# Patient Record
Sex: MALE | Race: WHITE
[De-identification: names, ages, dates, MRNs, and addresses within clinical notes are randomized per-mention and may not be internally consistent; named-entity substitution may affect disease eponyms.]

---

## 2018-06-21 ENCOUNTER — HOSPITAL ENCOUNTER (OUTPATIENT)
Dept: HOSPITAL 89 - ONC | Age: 58
LOS: 1 days | Discharge: HOME | End: 2018-06-22
Attending: NURSE PRACTITIONER
Payer: SELF-PAY

## 2018-06-21 VITALS — BODY MASS INDEX: 24.36 KG/M2

## 2018-06-21 VITALS — DIASTOLIC BLOOD PRESSURE: 85 MMHG | SYSTOLIC BLOOD PRESSURE: 138 MMHG

## 2018-06-21 DIAGNOSIS — Z85.72: Primary | ICD-10-CM

## 2018-06-21 PROCEDURE — 99212 OFFICE O/P EST SF 10 MIN: CPT

## 2018-06-21 NOTE — ONCOLOGY NOTE
EVALUATION~DATE~& TIME: 2018


PRIMARY CARE PHYSICIAN: None, pt. is self employed  has no insurance


LAST SEEN BY DR. Blanco on 2017


ACCOMPANIED BY: self





 Chief complaint: F/u on DLBC lymphoma





DIAGNOSIS:  stage II Diffuse large B-cell lymphoma


Oncology History


-  2015 newly diagnosed diffuse large B cell lymphoma, likely ABC 

subtype


Baseline echocardiogram: Sinus rhythm with frequent premature ventricular 

complexes


Rightward axis


Borderline ECG


No previous ECGs available


Confirmed by REBECCA CASTILLO ([id number]) on 2015 9:24:51 PM


2015


SURGEON: John Ullrich, MD


Bone marrow aspirate.


2.  Bone core biopsy.


3.  Right shoulder lipoma


A 10 mm flat Adi-Ware drain was placed in the cavity left behind after 

excising the right shoulder lipoma.





port placement, unilateral bone marrow biopsy, PET scan in [place]





Treatment 


He completed R-Chop x 6 cycles between 10-1-15 to 16.  with curative intent


He completed a PET scan  on 16 which showed a complete response to 

treatment with no metabolic activity.  


His spleen had decreased in size and numerous fractures were healing.





HPI


Mr. Joy is a very pleasant  57 year old man who has  stage II Diffuse large B

-cell lymphoma, status post 6 cycles of R CHOP in 2016 . Overall 

patient reports that he is feeling very well he denies any B- symptoms such as 

drenching night sweats, fatigue, or palpable lymph nodes. He  keeps very active 

as a contractor worker, Reports no fatigue, no fevers, no recent 

hospitalizations, reports no GI symptoms as well as no changes in bowel or 

bladder pattern. no weight loss or weight gain. Patient informs me that he is 

currently completing a 14 day course of antibiotics for a tooth extraction 

procedure planned in the near future, he reports antibiotic being keflex. 








Living conditions: lives alone, 2 children are his support system





Diagnostic tests & Reports


Reviewed on [employer]





PAST MEDICAL/SURGICAL HISTORY


FAMILY~HISTORY:


MI (myocardial infarction)


  FATHER, and uncle , Age:55





Psychosocial History 


Social History


Patient is single, , has  two children a 35 year old daughter and a 32 

year  old son. 


Occupational History


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 

a week.


Alcohol History


He denies use


Smoking History:  1pack day since 27 years old


Smoking Status:  Yes, less than 1 pack per day





Medications and Allergies


Reported Medications


Aspirin (Aspir 81)81 Mg Tablet.dr81 Mg PO QDAY


   3/14/17


Multivitamin (Multi-Vitamin Daily)1 Each Tablet1 Each PO


   9/23/15


Omega-3 Fatty Acids (Fish Oil)Unknown Strength Capsule.Unknown Dose PO


   9/2/15


Allergies:  


Coded Allergies:  


     Penicillins (Verified  Allergy, Unknown, 12/18/15)





SOCIAL /OCCUPATION HISTORY:


PREVENTIVE: tetanus vaccine 2017 (reported)


-Colonoscopy. Not on file


MEDICATIONS:


Reported Medications


Aspirin (Aspir 81)81 Mg Tablet.dr81 Mg PO QDAY


   3/14/17


Multivitamin (Multi-Vitamin Daily)1 Each Tablet1 Each PO


   9/23/15


Omega-3 Fatty Acids (Fish Oil)Unknown Strength Capsule.dUnknown Dose PO


   9/2/15


Keflex for pre-op preparation tooth extraction 18


ALLERGIES:


Penicillins (Verified  Allergy, Unknown, 18)


Review of System


CONSTITUTION: denies fevers, sweats, change in appetite, energy, or weight


EYES: No blurred vision, no double vision


ENT: no mouth soreness, trouble swallowing, neck masses


RESPIRATORY: Denies pleuritic pain, dyspnea, wheezing, coughing


CARDIOVASCULAR: Denies cardiac type chest pain, palpitations, leg edema


GI: denies trouble swallowing, indigestion, abdominal pain, diarrhea, 

constipation, blood in stool


: No blood in the urine, no urinary urgency/frequency, no dysuria, no 


MUSCULOSKELETAL: no joint pain, no muscle pain, no limited ROM, no back pain


NEURO: denies headaches, dizziness, neuropathy, focal weakness


SKIN: denies bruising, rashes, changing or suspicious lesions,


HEMATOLOGY: denies spontaneous bleeding, denies non-palpable nodes


PSYCH: denies mood changes, depression, anxiety





Physical Exam


Vital Signs


Temperature:         97.8


Pulse:                    91 


BP Systolic:           13


BP Diastolic:          8 


Respiratory Rate:   16


O2 SAT:                94% RA


O2 Delivery:            





Height (inches)      69.00  


Weight lb:             165 


Weight oz:            


Weight Kg (Sandip):     





Pain:                    0


VITALS


PERFORMANCE STATUS: ECOG O- fully active, able to carry on all pre-disease 

performance w/o restriction


GENERAL: pleasant conversant, male in no apparent distress


ORAL: mucosa moist without lesions, pharynx not injected


EYES: no icterus, no pale conjunctivae, EOMI, PERRLA


NECK: supple, no masses, no palpable lymph nodes


LUNGS: clear to auscultation bilaterally, breathing, non-labored


CVS: regular rate, rhythm, nl s1, s2, no murmurs


ABD: normal bowel sounds, soft non tender, non-distended, no hepatomegaly, no 

splenomegaly, no masses


EXTREMITIES: no edema, no cyanosis


MUSCULOSKELETAL: grossly normal gait, range of motion stable


NEURO: alert, appropriate, motor grossly normal, sensory grossly non focal, and 

cranial nerves grossly intact


NODES: no cervical, supraclavicular, axillary, inguinal adenopathy


SKIN: no ecchymosis, petechiae, no open wounds, no itchiness.


PSYCH: normal mood and affect, good judgment and insight.








Assessment & Plan





Mr. Joy is a very pleasant  57 year old man who has  stage II Diffuse large B

-cell lymphoma, status post 6 cycles of R CHOP in 2016 . PET scan shows 

disease in remission. 


 WBC : 8.9; HGB 18.5; ANC5.1; uric acid 6.1; there was no LDH value data.





1. F/u in 6 months with MD/JOB with CBC w/diff, CMP. LDH


2. Hypertriglyceridemia labs on 18 show Tr of 188. TLC teaching provided 

to patient, to cut amount of cheese, summer sausage, increase fruit, vegetables

, and water intake. 


3. Patient is due for Colonoscopy, he never had one at 57 years old


4. Monitor Triglycerides levels with next labs


5. patient to call cancer center with any issues or concerns





TIME SPENT:  15 minutes > 10 minutes includes but not limited to discussion, 

counselling and co-ordination~ of care. Discussion with other health care 

providers, record review, review of lab work, diagnostic tests.


Plan discussed extensively with patient. All the questions answered today. 

Thank you for the opportunity to be involved in the care of Mr. Joy.


Billing Level: Return visit 3














ISABELLA WARE, ONC 2018 10:23

## 2018-12-17 ENCOUNTER — HOSPITAL ENCOUNTER (OUTPATIENT)
Dept: HOSPITAL 89 - ONC | Age: 58
LOS: 49 days | Discharge: HOME | End: 2019-02-04
Attending: INTERNAL MEDICINE
Payer: SELF-PAY

## 2018-12-17 VITALS — WEIGHT: 176.37 LBS | BODY MASS INDEX: 24.36 KG/M2

## 2018-12-17 VITALS — DIASTOLIC BLOOD PRESSURE: 79 MMHG | SYSTOLIC BLOOD PRESSURE: 124 MMHG

## 2018-12-17 DIAGNOSIS — Z79.82: ICD-10-CM

## 2018-12-17 DIAGNOSIS — Z92.21: ICD-10-CM

## 2018-12-17 DIAGNOSIS — D58.2: ICD-10-CM

## 2018-12-17 DIAGNOSIS — C83.30: Primary | ICD-10-CM

## 2018-12-17 PROCEDURE — 99212 OFFICE O/P EST SF 10 MIN: CPT

## 2018-12-17 NOTE — SCHUSTER ONCOLOGY NOTE
EVENT DATE:  December 17, 2018



CHIEF COMPLAINT/REASON FOR VISIT

Mr. Joy is a pleasant 58-year-old gentleman with stage II diffuse large B-

cell lymphoma, status post six cycles of R-CHOP, finishing in January 2016 that 

returns for followup. 



HISTORY OF PRESENT ILLNESS 

Ramírez returns.  He finished R-CHOP chemotherapy in January 2016 and his post-

treatment PET scan showed complete remission.  He continues to do well.  His 

energy is excellent and he continues to work full-time.  He has gained a little 

bit of weight and has maintained this. No concerning lumps or bumps, fevers, 

chills, night sweats or other symptoms today.  No GI symptoms of concern. 



PAST MEDICAL HISTORY, PAST SURGICAL HISTORY

Diffuse large B cell lymphoma likely stage II, likely ABC subtype, diagnosed 

August 2015.  Treatment with curative intent was R-CHOP x6.



ALLERGIES

PENICILLIN.



MEDICATIONS

1.  Aspirin 18 mg daily.

2.  Multivitamin.



SOCIAL HISTORY

The patient is single.  He works as a contractor.  High intensity work, working 

seven days a week.  He is booked out over a year in advance.



FAMILY HISTORY

Noncontributory.



REVIEW OF SYSTEMS

CONSTITUTIONAL:  No fevers, chills or weight change that is unintentional.

HEENT:  No headache or vision changes.

CARDIOVASCULAR:  No chest pain, palpitations, dyspnea on exertion or edema.

RESPIRATORY:  No shortness of breath, wheeze or cough.

GASTROINTESTINAL:  No nausea, vomiting, diarrhea.

GENITOURINARY:  No dysuria or hematuria.

MUSCULOSKELETAL:  No weakness or joint pain.

PSYCHIATRIC:  No anxiety or depression.

ENDOCRINE:  No heat or cold intolerance.

LYMPHATIC:  No concerning lumps or bumps.

HEMATOLOGIC:  No bleeding or other concerns.

SKIN:  No concerning rashes or other lesions.  



PHYSICAL EXAMINATION 

VITAL SIGNS:  Blood pressure 124/79, pulse 85, respiratory rate 16, temperature 

97.6 Fahrenheit, oxygen saturation 94% on room air.  Weight 80 kg, which is 

stable from last year.  Pain 0/10, fatigue 0/10.  

GENERAL:  In stable condition, resting comfortably in the chair.  

HEENT:  Normocephalic, atraumatic.  

LYMPHATIC:  No appreciable cervical, supraclavicular or axillary adenopathy.  

CARDIOVASCULAR:  Regular rate and rhythm.  .

LUNGS:  Clear to auscultation bilaterally.  

ABDOMEN:  Soft, nontender, nondistended.  No organomegaly or masses.  

EXTREMITIES:  No clubbing, cyanosis or edema. 

SKIN:  No concerning findings. 



The remainder of the physical exam is otherwise unremarkable today.  



IMPRESSION AND PLAN 

Mr. Joy is a pleasant 58-year-old gentleman with the following:  Stage II 

diffuse large B cell lymphoma, likely aggressive subtype ABC by pathology.  He 

completed six cycles of R-CHOP in January 2016.  We would like to see him every 

six months with labs until year five and then annually after that.  Imaging only

as needed for concerning findings or history.  There are no such findings and so

imaging is required this month. I answered all of his questions today.  He is 

doing quite well.



Billing:  

Return visit level 3.

Total time 20 minutes, counseling time 15. 

MTDD

## 2019-06-10 ENCOUNTER — HOSPITAL ENCOUNTER (OUTPATIENT)
Dept: HOSPITAL 89 - ONC | Age: 59
LOS: 21 days | Discharge: HOME | End: 2019-07-01
Attending: INTERNAL MEDICINE
Payer: SELF-PAY

## 2019-06-10 VITALS — DIASTOLIC BLOOD PRESSURE: 80 MMHG | SYSTOLIC BLOOD PRESSURE: 137 MMHG

## 2019-06-10 VITALS — BODY MASS INDEX: 24.36 KG/M2

## 2019-06-10 DIAGNOSIS — F17.210: ICD-10-CM

## 2019-06-10 DIAGNOSIS — Z92.21: ICD-10-CM

## 2019-06-10 DIAGNOSIS — D75.1: ICD-10-CM

## 2019-06-10 DIAGNOSIS — Z85.72: Primary | ICD-10-CM

## 2019-06-10 PROCEDURE — 99212 OFFICE O/P EST SF 10 MIN: CPT

## 2019-06-11 NOTE — ONCOLOGY FOLLOW UP NOTE
EVENT DATE: Naya 10, 2019 



CHIEF COMPLAINT 

Followup for stage II diffuse large B-cell lymphoma.  



HISTORY OF PRESENT ILLNESS 

Patient is a 58-year-old male who is seen today in six-month followup.  Overall,

he feels well.  He has had no B symptomatology, including no night sweats, 

excessive fatigue, or unexplained weight loss.  He continues to work 10 to 12 

hours a day, seven days a week, which has been difficult, but he feels he is 

managing.  He denies any other new complaints.  



ONCOLOGY HISTORY 

Patient was diagnosed with diffuse large B-cell lymphoma.  He completed six 

cycles of R-CHOP chemotherapy in January 2016.  Post-treatment PET scan showed 

complete remission.  



PAST MEDICAL HISTORY

Diffuse large B-cell lymphoma, August 2015.  



SOCIAL HISTORY 

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  He 

smokes cigarettes but is trying to cut back.  



FAMILY HISTORY 

Noncontributory.  



MEDICATIONS 

1.  Aspirin 81 mg daily.  

2.  Multivitamin.  



ALLERGIES  

PENICILLIN.  



REVIEW OF SYSTEMS 

A 12-point review of systems is performed and is negative except as stated 

above.  



PHYSICAL EXAMINATION

VITAL SIGNS:  Weight 173 pounds, /80, P 76, R 16, temperature 98.1, O2 

saturation 94%.  

GENERAL:  Patient is a well-developed, well-nourished male in no acute distress.

 

HEAD:  Normocephalic, atraumatic.  

EYES:  Sclerae anicteric.  

MOUTH:  Moist mucous membranes.  

NECK:  Supple.  No palpable adenopathy.  

LUNGS:  Slightly diminished bilaterally but clear.  

CARDIOVASCULAR:  Heart rate regular, 76 per minute, without murmur, S3 or S4.  

ABDOMEN:  Soft, nontender, with active bowel sounds.  No organomegaly.  

EXTREMITIES:  No edema.  

NEURO:  Nonfocal.  



LABORATORY 

CBC on 06/06/19 showed a WBC of 7.0, hemoglobin 18.9, hematocrit 56.7, platelets

169,000.  CMP was within normal limits.  Immunoglobulins showed a slightly 

diminished IgG of 465.  



IMPRESSION 

The patient is a 58-year-old male who was diagnosed with stage II diffuse large 

B-cell lymphoma in August 2016.  He completed chemotherapy with six cycles of R-

CHOP in January 2016 with no evidence of recurrence.  



1.  Lymphoma.  No signs or symptoms of disease recurrence.  He feels very well 

and has no B symptomatology.  

2.  Smoking cessation.  We discussed this.  He has been trying to cut back.  He 

did not find that the patch was very helpful.  

3.  Erythrocytosis.  Hemoglobin is slightly elevated at 18.9.  He relates that 

he has had to donate blood previously.  [place]  

We will continue to monitor.  

4.  Follow up in six months for continued care.  CBC, CMP, LDH, and 

immunoglobulins will be drawn before that visit.  

KERRIE

## 2019-07-15 ENCOUNTER — HOSPITAL ENCOUNTER (OUTPATIENT)
Dept: HOSPITAL 89 - SPU | Age: 59
End: 2019-07-15
Attending: INTERNAL MEDICINE
Payer: SELF-PAY

## 2019-07-15 VITALS — DIASTOLIC BLOOD PRESSURE: 73 MMHG | SYSTOLIC BLOOD PRESSURE: 109 MMHG

## 2019-07-15 VITALS — BODY MASS INDEX: 24.36 KG/M2 | WEIGHT: 168.43 LBS

## 2019-07-15 VITALS — SYSTOLIC BLOOD PRESSURE: 122 MMHG | DIASTOLIC BLOOD PRESSURE: 87 MMHG

## 2019-07-15 DIAGNOSIS — K04.7: Primary | ICD-10-CM

## 2019-07-15 PROCEDURE — 96365 THER/PROPH/DIAG IV INF INIT: CPT
